# Patient Record
Sex: MALE | Race: WHITE | NOT HISPANIC OR LATINO
[De-identification: names, ages, dates, MRNs, and addresses within clinical notes are randomized per-mention and may not be internally consistent; named-entity substitution may affect disease eponyms.]

---

## 2024-01-24 ENCOUNTER — NON-APPOINTMENT (OUTPATIENT)
Age: 59
End: 2024-01-24

## 2024-01-31 PROBLEM — Z00.00 ENCOUNTER FOR PREVENTIVE HEALTH EXAMINATION: Status: ACTIVE | Noted: 2024-01-31

## 2024-02-01 ENCOUNTER — TRANSCRIPTION ENCOUNTER (OUTPATIENT)
Age: 59
End: 2024-02-01

## 2024-02-01 ENCOUNTER — APPOINTMENT (OUTPATIENT)
Dept: PULMONOLOGY | Facility: CLINIC | Age: 59
End: 2024-02-01
Payer: COMMERCIAL

## 2024-02-01 VITALS
SYSTOLIC BLOOD PRESSURE: 122 MMHG | WEIGHT: 300 LBS | OXYGEN SATURATION: 93 % | DIASTOLIC BLOOD PRESSURE: 80 MMHG | TEMPERATURE: 97.4 F | HEIGHT: 72 IN | HEART RATE: 70 BPM | BODY MASS INDEX: 40.63 KG/M2

## 2024-02-01 DIAGNOSIS — Z87.891 PERSONAL HISTORY OF NICOTINE DEPENDENCE: ICD-10-CM

## 2024-02-01 DIAGNOSIS — R06.09 OTHER FORMS OF DYSPNEA: ICD-10-CM

## 2024-02-01 PROCEDURE — 94010 BREATHING CAPACITY TEST: CPT

## 2024-02-01 PROCEDURE — 99204 OFFICE O/P NEW MOD 45 MIN: CPT | Mod: 25

## 2024-02-01 PROCEDURE — 94729 DIFFUSING CAPACITY: CPT

## 2024-02-01 PROCEDURE — 94727 GAS DIL/WSHOT DETER LNG VOL: CPT

## 2024-02-01 RX ORDER — SERTRALINE HYDROCHLORIDE 100 MG/1
100 TABLET, FILM COATED ORAL
Refills: 0 | Status: ACTIVE | COMMUNITY

## 2024-02-01 RX ORDER — ALBUTEROL SULFATE 90 UG/1
108 (90 BASE) INHALANT RESPIRATORY (INHALATION) EVERY 4 HOURS
Qty: 1 | Refills: 6 | Status: ACTIVE | COMMUNITY
Start: 2024-02-01 | End: 1900-01-01

## 2024-02-01 RX ORDER — SEMAGLUTIDE 0.68 MG/ML
INJECTION, SOLUTION SUBCUTANEOUS
Refills: 0 | Status: ACTIVE | COMMUNITY

## 2024-02-01 RX ORDER — SERTRALINE HYDROCHLORIDE 50 MG/1
50 TABLET, FILM COATED ORAL
Refills: 0 | Status: ACTIVE | COMMUNITY

## 2024-02-01 NOTE — DISCUSSION/SUMMARY
[FreeTextEntry1] : Mr. Gonzalez is here because occasionally he is wheezing and has dyspnea on exertion.  I think the majority of his problem is his BMI of greater than 40.  The patient could lose in excess of 100 pounds and would feel better.  I had extensive discussion with him regarding losing weight and the need for possible dietitian.  He will investigate this option.  I have asked the patient to use albuterol 2 sprays 4 times a day on an as-needed basis.  He will see if this affords him any relief.  CAT scan of the chest in March 2022 showed a 4 mm nodule in the left midlung field.  Per the current recommendations and a low risk patient no follow-up CAT scan is indicated at this time. The patient understands and agrees with plan of care. Today's office visit encompassed 46 minutes. I conducted an extensive history,physical exam and reviewed diagnosis and treatment options including diagnostic tests,radiology studies including cat scans and the use of prescription medication.

## 2024-02-01 NOTE — PROCEDURE
[FreeTextEntry1] : Chest x-ray 1/17/2024 is normal.  CAT scan of the chest March 31, 2022 22 4 mm nodule left midlung field.  Pulmonary function test 2/1/2024 no restrictive or obstructive lung disease.  Decreased ERV secondary to obesity.

## 2024-02-01 NOTE — HISTORY OF PRESENT ILLNESS
[Former] : former [Never] : never [TextBox_4] : 58 male quit  tobacco 31yago   1ppd x 10 yrs Presents today bec of wheeze with lying down and occ  dyspnea on exertion  and feel cant breathe out x 2 months precipitating factors none  +cat and dog t home not new no birds no nite awakening full activity  occ   no asbestos /chemical Hx lung nodules 2 yr ago Mar 2022  [TextBox_11] : 1 [TextBox_13] : 10 [YearQuit] : 1990

## 2024-02-01 NOTE — REASON FOR VISIT
[Initial] : an initial visit [Abnormal CXR/ Chest CT] : an abnormal CXR/ chest CT [Pulmonary Nodules] : pulmonary nodules [TextBox_44] : Patient was referred to pulmonary by PCP for Pulmonary Nodules. [TextBox_13] : DR. Hernadez

## 2024-05-02 ENCOUNTER — APPOINTMENT (OUTPATIENT)
Dept: PULMONOLOGY | Facility: CLINIC | Age: 59
End: 2024-05-02